# Patient Record
Sex: MALE | URBAN - METROPOLITAN AREA
[De-identification: names, ages, dates, MRNs, and addresses within clinical notes are randomized per-mention and may not be internally consistent; named-entity substitution may affect disease eponyms.]

---

## 2019-01-27 ENCOUNTER — EMERGENCY (EMERGENCY)
Facility: HOSPITAL | Age: 39
LOS: 0 days | Discharge: HOME | End: 2019-01-28
Attending: EMERGENCY MEDICINE
Payer: SELF-PAY

## 2019-01-27 VITALS
RESPIRATION RATE: 17 BRPM | OXYGEN SATURATION: 100 % | DIASTOLIC BLOOD PRESSURE: 87 MMHG | SYSTOLIC BLOOD PRESSURE: 126 MMHG | HEART RATE: 86 BPM

## 2019-01-27 VITALS
SYSTOLIC BLOOD PRESSURE: 134 MMHG | HEART RATE: 98 BPM | RESPIRATION RATE: 18 BRPM | DIASTOLIC BLOOD PRESSURE: 89 MMHG | HEIGHT: 66 IN | OXYGEN SATURATION: 100 % | WEIGHT: 160.06 LBS

## 2019-01-27 DIAGNOSIS — F10.129 ALCOHOL ABUSE WITH INTOXICATION, UNSPECIFIED: ICD-10-CM

## 2019-01-27 DIAGNOSIS — F10.929 ALCOHOL USE, UNSPECIFIED WITH INTOXICATION, UNSPECIFIED: ICD-10-CM

## 2019-01-27 PROCEDURE — 99285 EMERGENCY DEPT VISIT HI MDM: CPT | Mod: 25

## 2019-01-27 PROCEDURE — 99053 MED SERV 10PM-8AM 24 HR FAC: CPT

## 2019-01-27 NOTE — ED ADULT NURSE NOTE - NSIMPLEMENTINTERV_GEN_ALL_ED
Implemented All Fall with Harm Risk Interventions:  [place] to call system. Call bell, personal items and telephone within reach. Instruct patient to call for assistance. Room bathroom lighting operational. Non-slip footwear when patient is off stretcher. Physically safe environment: no spills, clutter or unnecessary equipment. Stretcher in lowest position, wheels locked, appropriate side rails in place. Provide visual cue, wrist band, yellow gown, etc. Monitor gait and stability. Monitor for mental status changes and reorient to person, place, and time. Review medications for side effects contributing to fall risk. Reinforce activity limits and safety measures with patient and family. Provide visual clues: red socks.

## 2019-01-27 NOTE — ED PROVIDER NOTE - OBJECTIVE STATEMENT
37 y/o m with no PMHx presenting to the ED c/o EtOH intoxication. Pt was found walking down [facility], unsteady on his feet due to EtOH intox. Pt states "I drank a lot". Denies head trauma. Denies any pain, CP, dizziness. Pt is a poor historian secondary to EtOH intoxication. [place]  used, ID#: [id number]

## 2019-01-27 NOTE — ED PROVIDER NOTE - PLAN OF CARE
1. return for worsening symptoms or anything concerning to you  2. take all home meds as prescribed  3. follow up with your pmd call to make an appointment    Alcohol Abuse    Alcohol intoxication occurs when the amount of alcohol that a person has consumed impairs his or her ability to mentally and physically function. Chronic alcohol consumption can also lead to a variety of health issues including neurological disease, stomach disease, heart disease, liver disease, etc. Do not drive after drinking alcohol. Drinking enough alcohol to end up in an Emergency Room suggests you may have an alcohol abuse problem. Seek help at a drug addiction center.    SEEK IMMEDIATE MEDICAL CARE IF YOU HAVE ANY OF THE FOLLOWING SYMPTOMS: seizures, vomiting blood, blood in your stool, lightheadedness/dizziness, or becoming shaky to tremulous when you stop drinking.

## 2019-01-27 NOTE — ED PROVIDER NOTE - PROGRESS NOTE DETAILS
patient talking on phone ate sandwich and drinking water. resting comfortably. Emile Summers M.D., Attending Physician patient feeling better. walked to bathroom without issue. states he will take a taxi home. clinically sober. ambulatory vss. tolerating PO. no signs of trauma. will d/c with follow up. Emile Summers M.D., Attending Physician

## 2019-01-27 NOTE — ED PROVIDER NOTE - MEDICAL DECISION MAKING DETAILS
37 y/o m BIB EMS for walking with unsteady gait down street, pt found to be intoxicated, states he had many drinks, denies head trauma, pain or any other c/o a this time. Exam with intoxicated male, no signs of trauma, exam nonfocal, will observe until clinically sober and reassess.

## 2019-01-27 NOTE — ED PROVIDER NOTE - NS_ ATTENDINGSCRIBEDETAILS _ED_A_ED_FT
I, Emile Summers MD,  performed the initial face to face bedside interview with this patient regarding history of present illness, review of symptoms and relevant past medical, social and family history.  I completed an independent physical examination.  I was the initial provider who evaluated this patient.  The history, relevant review of systems, past medical and surgical history, medical decision making, and physical examination was documented by the scribe in my presence and I attest to the accuracy of the documentation.

## 2019-01-27 NOTE — ED PROVIDER NOTE - CARE PLAN
Principal Discharge DX:	Alcohol intoxication  Assessment and plan of treatment:	1. return for worsening symptoms or anything concerning to you  2. take all home meds as prescribed  3. follow up with your pmd call to make an appointment    Alcohol Abuse    Alcohol intoxication occurs when the amount of alcohol that a person has consumed impairs his or her ability to mentally and physically function. Chronic alcohol consumption can also lead to a variety of health issues including neurological disease, stomach disease, heart disease, liver disease, etc. Do not drive after drinking alcohol. Drinking enough alcohol to end up in an Emergency Room suggests you may have an alcohol abuse problem. Seek help at a drug addiction center.    SEEK IMMEDIATE MEDICAL CARE IF YOU HAVE ANY OF THE FOLLOWING SYMPTOMS: seizures, vomiting blood, blood in your stool, lightheadedness/dizziness, or becoming shaky to tremulous when you stop drinking.

## 2019-01-27 NOTE — ED PROVIDER NOTE - PHYSICAL EXAMINATION
Constitutional: NAD, +intoxicated  Eyes: PERRLA EOMI  Head: Normocephalic atraumatic  Mouth: MMM  Cardiac: regular rate   Resp: Lungs CTAB  GI: Abd s/nt/nd  Neuro: CN2-12 intact  Skin: No rashes

## 2019-01-28 NOTE — ED ADULT NURSE REASSESSMENT NOTE - NS ED NURSE REASSESS COMMENT FT1
patient ambulatory with steady gait. awake, alert and oriented x 4, discharged home. written and verbal discharge instructions given to patient, patient verbalized back understanding.

## 2019-05-28 ENCOUNTER — EMERGENCY (EMERGENCY)
Facility: HOSPITAL | Age: 39
LOS: 1 days | Discharge: HOME | End: 2019-05-28
Attending: STUDENT IN AN ORGANIZED HEALTH CARE EDUCATION/TRAINING PROGRAM
Payer: MEDICAID

## 2019-05-28 VITALS
OXYGEN SATURATION: 99 % | SYSTOLIC BLOOD PRESSURE: 125 MMHG | HEART RATE: 67 BPM | WEIGHT: 182.98 LBS | RESPIRATION RATE: 18 BRPM | DIASTOLIC BLOOD PRESSURE: 83 MMHG | TEMPERATURE: 98 F | HEIGHT: 70.87 IN

## 2019-05-28 PROCEDURE — 99283 EMERGENCY DEPT VISIT LOW MDM: CPT

## 2019-05-28 PROCEDURE — 96372 THER/PROPH/DIAG INJ SC/IM: CPT

## 2019-05-28 PROCEDURE — 99283 EMERGENCY DEPT VISIT LOW MDM: CPT | Mod: 25

## 2019-05-28 RX ORDER — CYCLOBENZAPRINE HYDROCHLORIDE 10 MG/1
1 TABLET, FILM COATED ORAL
Qty: 9 | Refills: 0
Start: 2019-05-28

## 2019-05-28 RX ORDER — KETOROLAC TROMETHAMINE 30 MG/ML
60 SYRINGE (ML) INJECTION ONCE
Refills: 0 | Status: DISCONTINUED | OUTPATIENT
Start: 2019-05-28 | End: 2019-05-28

## 2019-05-28 RX ORDER — CYCLOBENZAPRINE HYDROCHLORIDE 10 MG/1
10 TABLET, FILM COATED ORAL ONCE
Refills: 0 | Status: COMPLETED | OUTPATIENT
Start: 2019-05-28 | End: 2019-05-28

## 2019-05-28 RX ADMIN — CYCLOBENZAPRINE HYDROCHLORIDE 10 MILLIGRAM(S): 10 TABLET, FILM COATED ORAL at 16:42

## 2019-05-28 RX ADMIN — Medication 60 MILLIGRAM(S): at 17:00

## 2019-05-28 RX ADMIN — Medication 60 MILLIGRAM(S): at 16:44

## 2019-05-28 NOTE — ED ADULT NURSE NOTE - OBJECTIVE STATEMENT
patient came in the er with c/o lower back pain  since 3 days ,,8/10,ambulatory ,no bruises ,swelling ,aax3,

## 2019-05-28 NOTE — ED PROVIDER NOTE - OBJECTIVE STATEMENT
39 y.o w/ pmh of chronic back pain x years, with worsening pain x 3 days after lifting heavy object. denies trauma, fall, dysuria, leg weakness, midline back pain, urinary/fecal incon or retention.

## 2019-05-28 NOTE — ED ADULT NURSE NOTE - NSIMPLEMENTINTERV_GEN_ALL_ED
Implemented All Universal Safety Interventions:  [place] to call system. Call bell, personal items and telephone within reach. Instruct patient to call for assistance. Room bathroom lighting operational. Non-slip footwear when patient is off stretcher. Physically safe environment: no spills, clutter or unnecessary equipment. Stretcher in lowest position, wheels locked, appropriate side rails in place.

## 2019-05-28 NOTE — ED PROVIDER NOTE - CLINICAL SUMMARY MEDICAL DECISION MAKING FREE TEXT BOX
patient presenting with acute on chronic back pain. neuro intact. no sign of caude. ambulatory. likely msk given no trauma. will treat pain and recommend pmd f.u

## 2019-12-30 ENCOUNTER — EMERGENCY (EMERGENCY)
Facility: HOSPITAL | Age: 39
LOS: 1 days | Discharge: HOME | End: 2019-12-30
Attending: EMERGENCY MEDICINE
Payer: SELF-PAY

## 2019-12-30 VITALS
DIASTOLIC BLOOD PRESSURE: 70 MMHG | HEIGHT: 64 IN | RESPIRATION RATE: 18 BRPM | TEMPERATURE: 98 F | WEIGHT: 184.97 LBS | SYSTOLIC BLOOD PRESSURE: 123 MMHG | HEART RATE: 65 BPM | OXYGEN SATURATION: 95 %

## 2019-12-30 VITALS
RESPIRATION RATE: 18 BRPM | SYSTOLIC BLOOD PRESSURE: 111 MMHG | DIASTOLIC BLOOD PRESSURE: 77 MMHG | HEART RATE: 60 BPM | TEMPERATURE: 98 F | OXYGEN SATURATION: 100 %

## 2019-12-30 PROCEDURE — 96372 THER/PROPH/DIAG INJ SC/IM: CPT

## 2019-12-30 PROCEDURE — 99283 EMERGENCY DEPT VISIT LOW MDM: CPT

## 2019-12-30 PROCEDURE — 99283 EMERGENCY DEPT VISIT LOW MDM: CPT | Mod: 25

## 2019-12-30 RX ORDER — KETOROLAC TROMETHAMINE 30 MG/ML
60 SYRINGE (ML) INJECTION ONCE
Refills: 0 | Status: DISCONTINUED | OUTPATIENT
Start: 2019-12-30 | End: 2019-12-30

## 2019-12-30 RX ORDER — CYCLOBENZAPRINE HYDROCHLORIDE 10 MG/1
10 TABLET, FILM COATED ORAL ONCE
Refills: 0 | Status: COMPLETED | OUTPATIENT
Start: 2019-12-30 | End: 2019-12-30

## 2019-12-30 RX ADMIN — CYCLOBENZAPRINE HYDROCHLORIDE 10 MILLIGRAM(S): 10 TABLET, FILM COATED ORAL at 10:00

## 2019-12-30 RX ADMIN — Medication 60 MILLIGRAM(S): at 10:00

## 2019-12-30 NOTE — ED ADULT NURSE NOTE - NSIMPLEMENTINTERV_GEN_ALL_ED
Implemented All Fall with Harm Risk Interventions:  [place] to call system. Call bell, personal items and telephone within reach. Instruct patient to call for assistance. Room bathroom lighting operational. Non-slip footwear when patient is off stretcher. Physically safe environment: no spills, clutter or unnecessary equipment. Stretcher in lowest position, wheels locked, appropriate side rails in place. Provide visual cue, wrist band, yellow gown, etc. Monitor gait and stability. Monitor for mental status changes and reorient to person, place, and time. Review medications for side effects contributing to fall risk. Reinforce activity limits and safety measures with patient and family. Provide visual clues: red socks.

## 2019-12-30 NOTE — ED PROVIDER NOTE - PATIENT PORTAL LINK FT
You can access the FollowMyHealth Patient Portal offered by Health system by registering at the following website: [email or web address]. By joining [employer]’s FollowMyHealth portal, you will also be able to view your health information using other applications (apps) compatible with our system.

## 2019-12-30 NOTE — ED ADULT NURSE NOTE - CHPI ED NUR SYMPTOMS NEG
no neck tenderness/no tingling/no difficulty bearing weight/no constipation/no fatigue/no motor function loss/no numbness/no bladder dysfunction/no anorexia/no bowel dysfunction

## 2019-12-30 NOTE — ED ADULT NURSE NOTE - OBJECTIVE STATEMENT
Patient present to ED with c/o lower back pain on going for awhile more than a year just more recently past 2 days sharp pain on pain scale 7/10 took tylenol 2 days ago with relief.

## 2019-12-30 NOTE — ED PROVIDER NOTE - OBJECTIVE STATEMENT
39 year old male with PMHx of chronic back pain and no pertinent PSHx presents to the ED with complaints of back pain for two days. Patient denies any specific trauma which could have triggered the pain. Patient describes the pain as being localized to his lower back. Patient otherwise denies any saddle anesthesia, urinary incontinence, urinary frequency, and all other acute complaints. NKDA.

## 2020-04-14 NOTE — ED ADULT TRIAGE NOTE - WEIGHT METHOD
Patient is scheduled with BRIANA on 4/27/2020 at 100.     Patient consents for appointment to be changed to a TELEPHONE VISIT     Sessions Via Telephone    Patient can be reached at [phone] for their appointment.     Patient notified to stay close by their phone 15 minutes before and 15 minutes after their scheduled appointment time, in case provider is running early or late.     Phone call may come from a blocked number.     If patient is unable to be reached for their appointment at the time when the provider calls, the provider will try calling again 5 minutes later. If the second call is missed, patient's appointment will be cancelled. Do not call back..                stated

## 2022-06-14 NOTE — ED ADULT NURSE NOTE - NSFALLRSKOUTCOME_ED_ALL_ED
Fall with Harm Risk Additional Notes: Discussed starting Accutane vs oral antibiotics and topical treatments at next OV. Patient will try to obtain previous dermatologist clinic notes. Patient given a lab slip and will follow up in 1 week to start isotretinoin paperwork. Render Risk Assessment In Note?: no Detail Level: Simple Additional Notes: Counseled patient regarding scarring treatments and patient was informed that laser, microneedling and chemical peels cannot be done while on Accutane.

## 2022-06-24 ENCOUNTER — EMERGENCY (EMERGENCY)
Facility: HOSPITAL | Age: 42
LOS: 1 days | Discharge: HOME | End: 2022-06-24
Attending: STUDENT IN AN ORGANIZED HEALTH CARE EDUCATION/TRAINING PROGRAM
Payer: MEDICAID

## 2022-06-24 VITALS
HEIGHT: 64 IN | OXYGEN SATURATION: 97 % | RESPIRATION RATE: 16 BRPM | TEMPERATURE: 98 F | HEART RATE: 66 BPM | SYSTOLIC BLOOD PRESSURE: 139 MMHG | DIASTOLIC BLOOD PRESSURE: 76 MMHG | WEIGHT: 185.19 LBS

## 2022-06-24 PROBLEM — M54.9 DORSALGIA, UNSPECIFIED: Chronic | Status: ACTIVE | Noted: 2019-12-30

## 2022-06-24 PROCEDURE — 90715 TDAP VACCINE 7 YRS/> IM: CPT

## 2022-06-24 PROCEDURE — 12011 RPR F/E/E/N/L/M 2.5 CM/<: CPT

## 2022-06-24 PROCEDURE — 99283 EMERGENCY DEPT VISIT LOW MDM: CPT | Mod: 25

## 2022-06-24 PROCEDURE — 90471 IMMUNIZATION ADMIN: CPT

## 2022-06-24 RX ORDER — TETANUS TOXOID, REDUCED DIPHTHERIA TOXOID AND ACELLULAR PERTUSSIS VACCINE, ADSORBED 5; 2.5; 8; 8; 2.5 [IU]/.5ML; [IU]/.5ML; UG/.5ML; UG/.5ML; UG/.5ML
0.5 SUSPENSION INTRAMUSCULAR ONCE
Refills: 0 | Status: COMPLETED | OUTPATIENT
Start: 2022-06-24 | End: 2022-06-24

## 2022-06-24 RX ADMIN — TETANUS TOXOID, REDUCED DIPHTHERIA TOXOID AND ACELLULAR PERTUSSIS VACCINE, ADSORBED 0.5 MILLILITER(S): 5; 2.5; 8; 8; 2.5 SUSPENSION INTRAMUSCULAR at 15:19

## 2022-06-24 NOTE — ED PROVIDER NOTE - CLINICAL SUMMARY MEDICAL DECISION MAKING FREE TEXT BOX
43 y/o male presenting with a head injury resulting in an eyebrow laceration. No indications for imaging at this time. Will repair laceration and update tetanus.

## 2022-06-24 NOTE — ED PROVIDER NOTE - OBJECTIVE STATEMENT
41 y/o male with no significant past medical history presents with a right eyebrow laceration. About 2 hours ago, patient states he hit the back of his head from his car door and the car door hit his right eyebrow. He denies any loss of consciousness, visual changes or any other injuries. Patient is unsure of his last tetanus. NKDA.

## 2022-06-24 NOTE — ED PROVIDER NOTE - PATIENT PORTAL LINK FT
You can access the FollowMyHealth Patient Portal offered by [facility] by registering at the following website: [email or web address]. By joining [employer]’s FollowMyHealth portal, you will also be able to view your health information using other applications (apps) compatible with our system.

## 2022-06-24 NOTE — ED ADULT NURSE NOTE - NSIMPLEMENTINTERV_GEN_ALL_ED
----- Message from Christiano Mcgee MD sent at 7/18/2017  5:02 PM CDT -----  Result of the thyroid ultrasound discussed with patient today. Arrangement will be made for FNA with Dr. Foreman, referral made   Implemented All Universal Safety Interventions:  [place] to call system. Call bell, personal items and telephone within reach. Instruct patient to call for assistance. Room bathroom lighting operational. Non-slip footwear when patient is off stretcher. Physically safe environment: no spills, clutter or unnecessary equipment. Stretcher in lowest position, wheels locked, appropriate side rails in place.

## 2022-06-24 NOTE — ED PROVIDER NOTE - EYES, MLM
Clear bilaterally, pupils equal, round and reactive to light. Clear bilaterally, pupils equal, round and reactive to light. EMOI

## 2022-06-24 NOTE — ED PROVIDER NOTE - NSFOLLOWUPINSTRUCTIONS_ED_ALL_ED_FT

## 2022-06-24 NOTE — ED PROVIDER NOTE - NS ED ATTENDING STATEMENT MOD
This was a shared visit with the TARUN. I reviewed and verified the documentation and independently performed the documented:

## 2022-06-24 NOTE — ED PROVIDER NOTE - SKIN, MLM
1.5cm mildly gaping linear laceration in right eyebrow. No sensory deficits and extraocular is intact.

## 2022-07-01 ENCOUNTER — EMERGENCY (EMERGENCY)
Facility: HOSPITAL | Age: 42
LOS: 1 days | Discharge: ANOTHER HOSPITAL | End: 2022-07-01
Attending: STUDENT IN AN ORGANIZED HEALTH CARE EDUCATION/TRAINING PROGRAM
Payer: MEDICAID

## 2022-07-01 VITALS
RESPIRATION RATE: 18 BRPM | SYSTOLIC BLOOD PRESSURE: 125 MMHG | HEART RATE: 78 BPM | TEMPERATURE: 98 F | WEIGHT: 184.97 LBS | HEIGHT: 64 IN | DIASTOLIC BLOOD PRESSURE: 70 MMHG | OXYGEN SATURATION: 98 %

## 2022-07-01 PROCEDURE — L9995: CPT

## 2022-07-01 PROCEDURE — G0463: CPT

## 2022-07-01 NOTE — ED PROVIDER NOTE - NS ED ROS FT

## 2022-07-01 NOTE — ED PROVIDER NOTE - NS ED MD DISPO DISCHARGE CCDA
Encounter addended by: Jes Stone RN on: 3/22/2022 5:40 PM   Actions taken: Vitals modified Patient/Caregiver provided printed discharge information.

## 2022-07-01 NOTE — ED PROVIDER NOTE - PHYSICAL EXAMINATION
Gen: AAOx3, non-toxic  Head: NCAT  HEENT: EOMI, oral mucosa moist, normal conjunctiva  Lung: CTAB, no respiratory distress, no wheezes/rhonchi/rales B/L, speaking in full sentences  CV: RRR, no murmurs, rubs or gallops  Abd: soft, NTND, no guarding, no CVA tenderness  MSK: no visible deformities  Neuro: No focal sensory or motor deficits, normal CN exam   Skin: +well healing R eyebrow lac with 3 sutures in place  Psych: normal affect.     Bryan Pierce, DO

## 2022-07-01 NOTE — ED PROVIDER NOTE - CLINICAL SUMMARY MEDICAL DECISION MAKING FREE TEXT BOX
42M presents for removal of 3 prolene sutures over right eyebrow. Placed on 6/24. States it has been healing well, no signs of infection. Will remove and dc.

## 2022-07-01 NOTE — ED PROVIDER NOTE - OBJECTIVE STATEMENT
42M presents for removal of 3 prolene sutures over right eyebrow. Placed on 6/24. States it has been healing well, no signs of infection.

## 2022-07-01 NOTE — ED PROVIDER NOTE - PATIENT PORTAL LINK FT
You can access the FollowMyHealth Patient Portal offered by [facility] by registering at the following website: [email or web address]. By joining [employer]’s FollowMyHealth portal, you will also be able to view your health information using other applications (apps) compatible with our system.

## 2022-12-19 ENCOUNTER — EMERGENCY (EMERGENCY)
Facility: HOSPITAL | Age: 42
LOS: 1 days | Discharge: HOME | End: 2022-12-19
Attending: EMERGENCY MEDICINE
Payer: MEDICAID

## 2022-12-19 VITALS
TEMPERATURE: 98 F | HEART RATE: 56 BPM | OXYGEN SATURATION: 98 % | DIASTOLIC BLOOD PRESSURE: 76 MMHG | HEIGHT: 67 IN | SYSTOLIC BLOOD PRESSURE: 117 MMHG | RESPIRATION RATE: 22 BRPM | WEIGHT: 186.07 LBS

## 2022-12-19 VITALS — HEART RATE: 59 BPM | RESPIRATION RATE: 18 BRPM | OXYGEN SATURATION: 98 %

## 2022-12-19 PROCEDURE — 99284 EMERGENCY DEPT VISIT MOD MDM: CPT

## 2022-12-19 PROCEDURE — 73030 X-RAY EXAM OF SHOULDER: CPT

## 2022-12-19 PROCEDURE — 73030 X-RAY EXAM OF SHOULDER: CPT | Mod: 26,RT

## 2022-12-19 PROCEDURE — 99283 EMERGENCY DEPT VISIT LOW MDM: CPT | Mod: 25

## 2022-12-19 RX ORDER — IBUPROFEN 200 MG
600 TABLET ORAL ONCE
Refills: 0 | Status: COMPLETED | OUTPATIENT
Start: 2022-12-19 | End: 2022-12-19

## 2022-12-19 RX ADMIN — Medication 600 MILLIGRAM(S): at 10:57

## 2022-12-19 NOTE — ED PROVIDER NOTE - PATIENT PORTAL LINK FT
You can access the FollowMyHealth Patient Portal offered by [facility] by registering at the following website: [email or web address]. By joining [employer]’s FollowMyHealth portal, you will also be able to view your health information using other applications (apps) compatible with our system.

## 2022-12-19 NOTE — ED PROVIDER NOTE - NSFOLLOWUPINSTRUCTIONS_ED_ALL_ED_FT
Thank you for choosing [facility] for your health care.    You were seen in the ER for right shoulder pain which is likely a muscular or rotator cuff injury.  We recommend you use Tylenol and Motrin at home as directed on the packaging along with ice to the painful areas.  You should follow-up with orthopedics if you are still having pain beyond 3 to 5 days for further evaluation.

## 2022-12-19 NOTE — ED PROVIDER NOTE - OBJECTIVE STATEMENT
42-year-old male presenting complaining of right shoulder pain.  He was moving boxes 3 days ago and the next day developed achy pains in his right shoulder worse when he moves around.  No loss of sensation.  He has not been taking any medications at home for the pain.  He denies any other complaints.  The pain does not radiate out of the shoulder.

## 2022-12-19 NOTE — ED ADULT NURSE NOTE - CHPI ED NUR SYMPTOMS NEG
no abrasion/no back pain/no bruising/no deformity/no difficulty bearing weight/no fever/no numbness/no weakness

## 2022-12-19 NOTE — ED PROVIDER NOTE - CLINICAL SUMMARY MEDICAL DECISION MAKING FREE TEXT BOX
Suspect muscular etiology of pain given his history.  X-ray right shoulder was obtained at patient's request for my suspicion for fracture or dislocation is low.  We will treat pain and recommend follow-up with orthopedics.

## 2022-12-19 NOTE — ED PROVIDER NOTE - NSFOLLOWUPCLINICS_GEN_ALL_ED_FT
[facility]  Orthopedics  [address] [place] [address]  Phone: [phone]  Fax: [phone]  Follow Up Time: 4-6 Days

## 2022-12-19 NOTE — ED PROVIDER NOTE - PHYSICAL EXAMINATION
Exam:  General: Patient well appearing, vital signs within normal limits  HEENT: airway patent with moist mucous membranes  Cardiac: RRR with strong peripheral pulses  Respiratory: no respiratory distress  Neuro: no gross neurologic deficits, strength and sensation to light touch intact  MSK: no gross deformity, ROM of L shoulder slightly limited secondary to pain, point tender along R pectoralis tendon reproduces complaint  Skin: warm, well perfused  Psych: normal mood and affect

## 2022-12-19 NOTE — ED ADULT TRIAGE NOTE - CHIEF COMPLAINT QUOTE
right shoulder pain as per pt he moved some boxes on Friday and pain started saturday, pain is worst on moving

## 2022-12-19 NOTE — ED ADULT NURSE NOTE - OBJECTIVE STATEMENT
AS per pt. c/o right shoulder pain that started 3days ago that doesn't get better with medication and has since gotten worst. Denies all other symptoms

## 2023-02-08 NOTE — ED PROVIDER NOTE - SCRIBE NAME
Jermaine Solis was seen and treated in our emergency department on 2/8/2023. He may return to work on 02/10/2023. If you have any questions or concerns, please don't hesitate to call.       Beronica Saha MD
Arun Urban (Scribe)

## 2023-05-03 NOTE — ED ADULT NURSE NOTE - CHPI ED NUR SYMPTOMS POS
[FreeTextEntry1] : follow up\par  [de-identified] : follow up\par \par \par DM- diet controlled - last hba1c 6.1\par \par HTN- doing well BLEEDING/LACERATION/PAIN

## 2023-08-11 NOTE — ED ADULT NURSE NOTE - PAIN RATING/NUMBER SCALE (0-10): REST
You can access the FollowMyHealth Patient Portal offered by [facility] by registering at the following website: [email or web address]. By joining [employer]’s FollowMyHealth portal, you will also be able to view your health information using other applications (apps) compatible with our system.
7

## 2023-08-19 ENCOUNTER — EMERGENCY (EMERGENCY)
Facility: HOSPITAL | Age: 43
LOS: 1 days | Discharge: HOME | End: 2023-08-19
Attending: STUDENT IN AN ORGANIZED HEALTH CARE EDUCATION/TRAINING PROGRAM
Payer: MEDICAID

## 2023-08-19 VITALS
HEART RATE: 65 BPM | TEMPERATURE: 98 F | SYSTOLIC BLOOD PRESSURE: 123 MMHG | WEIGHT: 179.9 LBS | DIASTOLIC BLOOD PRESSURE: 77 MMHG | RESPIRATION RATE: 18 BRPM | OXYGEN SATURATION: 97 % | HEIGHT: 67 IN

## 2023-08-19 PROCEDURE — 73610 X-RAY EXAM OF ANKLE: CPT

## 2023-08-19 PROCEDURE — 99284 EMERGENCY DEPT VISIT MOD MDM: CPT | Mod: 25

## 2023-08-19 PROCEDURE — 73630 X-RAY EXAM OF FOOT: CPT

## 2023-08-19 PROCEDURE — 99284 EMERGENCY DEPT VISIT MOD MDM: CPT

## 2023-08-19 PROCEDURE — 73630 X-RAY EXAM OF FOOT: CPT | Mod: 26,RT

## 2023-08-19 PROCEDURE — 73610 X-RAY EXAM OF ANKLE: CPT | Mod: 26,RT

## 2023-08-19 RX ORDER — IBUPROFEN 200 MG
600 TABLET ORAL ONCE
Refills: 0 | Status: COMPLETED | OUTPATIENT
Start: 2023-08-19 | End: 2023-08-19

## 2023-08-19 RX ADMIN — Medication 600 MILLIGRAM(S): at 11:47

## 2023-08-19 RX ADMIN — Medication 600 MILLIGRAM(S): at 11:17

## 2023-08-19 NOTE — ED PROVIDER NOTE - CLINICAL SUMMARY MEDICAL DECISION MAKING FREE TEXT BOX
43-year-old male with no past medical history presents with right lateral ankle and foot pain that began on Thursday.  Patient denies any obvious injury, but reports that he was sitting crosslegged and his foot was resting up against a desk.  Has not taken any medications for the pain.  Patient is able to ambulate.    Will obtain xrays to r/o fracture and give medications for pain. Low suspicion for fracture. Will give podiatry follow up.

## 2023-08-19 NOTE — ED PROVIDER NOTE - OBJECTIVE STATEMENT
43-year-old male with no past medical history presents with right lateral ankle and foot pain that began on Thursday.  Patient denies any obvious injury, but reports that he was sitting crosslegged and his foot was resting up against a desk.  Has not taken any medications for the pain.  Patient is able to ambulate.

## 2023-08-19 NOTE — ED PROVIDER NOTE - PROGRESS NOTE DETAILS
xrays negative. Will ace wrap and have patient follow up with podiatry as needed. Pt is well appearing walking with steady gait, stable for discharge and follow up without fail with medical doctor. I had a detailed discussion with the patient and/or guardian regarding the historical points, exam findings, and any diagnostic results supporting the discharge diagnosis. Pt educated on care and need for follow up. Strict return instructions and red flag signs and symptoms discussed with patient. Questions answered. Pt shows understanding of discharge information and agrees to follow.

## 2023-08-19 NOTE — ED PROVIDER NOTE - PHYSICAL EXAMINATION
Right foot - +TTP below lateral malleolus/lateral aspect of foot. No swelling, discoloration, erythema, ecchymosis, deformity, lacerations, abrasions, ulcers or sensory deficits. Popliteal, dorsalis pedis and posterior tibial pulses equally strong 4+ bilaterally. Capillary refill in lower extremity < 2 seconds. Full range of motion during dorsiflexion, plantar flexion, eversion and inversion. Extensor hallucis longus flexion and extension intact. DP and PT pulses 2+ bilaterally.

## 2023-08-19 NOTE — ED PROVIDER NOTE - NSFOLLOWUPINSTRUCTIONS_ED_ALL_ED_FT
Follow up with podiatry if pain persists longer than 2 weeks.     [facility]  Podiatry/Wound Care  [address] [place] [address]  Phone: [phone]  They have walk in hours on Tuesday from 12:30 pm to 2 pm and Thursdays 8:30 am to 10 am.     You can take motrin/tylenol as needed for pain.    If you experience any new or worsening symptoms or if you are concerned you can always come back to the emergency for a re-evaluation.

## 2023-08-19 NOTE — ED PROVIDER NOTE - PATIENT PORTAL LINK FT
You can access the FollowMyHealth Patient Portal offered by [facility] by registering at the following website: [email or web address]. By joining [employer]’s FollowMyHealth portal, you will also be able to view your health information using other applications (apps) compatible with our system.

## 2023-08-19 NOTE — ED ADULT NURSE NOTE - NSFALLUNIVINTERV_ED_ALL_ED
Call bell, personal items and telephone in reach/Instruct patient to call for assistance before getting out of bed/chair/stretcher/[place] to call system/Physically safe environment - no spills, clutter or unnecessary equipment/Purposeful proactive rounding/Room/bathroom lighting operational, light cord in reach

## 2023-09-26 ENCOUNTER — APPOINTMENT (OUTPATIENT)
Dept: UROLOGY | Facility: CLINIC | Age: 43
End: 2023-09-26
Payer: MEDICAID

## 2023-09-26 VITALS
OXYGEN SATURATION: 97 % | SYSTOLIC BLOOD PRESSURE: 112 MMHG | DIASTOLIC BLOOD PRESSURE: 75 MMHG | TEMPERATURE: 98.1 F | WEIGHT: 184 LBS | HEART RATE: 71 BPM | BODY MASS INDEX: 28.88 KG/M2 | HEIGHT: 67 IN

## 2023-09-26 DIAGNOSIS — Z80.42 FAMILY HISTORY OF MALIGNANT NEOPLASM OF PROSTATE: ICD-10-CM

## 2023-09-26 DIAGNOSIS — Z78.9 OTHER SPECIFIED HEALTH STATUS: ICD-10-CM

## 2023-09-26 DIAGNOSIS — F17.200 NICOTINE DEPENDENCE, UNSPECIFIED, UNCOMPLICATED: ICD-10-CM

## 2023-09-26 PROBLEM — Z00.00 ENCOUNTER FOR PREVENTIVE HEALTH EXAMINATION: Status: ACTIVE | Noted: 2023-09-26

## 2023-09-26 PROCEDURE — 99204 OFFICE O/P NEW MOD 45 MIN: CPT

## 2023-09-28 LAB
FSH SERPL-MCNC: 4.9 IU/L
LH SERPL-ACNC: 3.6 IU/L
PROLACTIN SERPL-MCNC: 11 NG/ML
TESTOST SERPL-MCNC: 362 NG/DL

## 2023-10-03 ENCOUNTER — APPOINTMENT (OUTPATIENT)
Dept: UROLOGY | Facility: CLINIC | Age: 43
End: 2023-10-03
Payer: MEDICAID

## 2023-10-03 PROCEDURE — 99406 BEHAV CHNG SMOKING 3-10 MIN: CPT

## 2023-10-03 PROCEDURE — 99214 OFFICE O/P EST MOD 30 MIN: CPT | Mod: 95,25

## 2024-01-25 NOTE — ED PROVIDER NOTE - CPE EDP RESP NORM
James is a 64 year old who is being evaluated via a billable video visit.      How would you like to obtain your AVS? MyChart  If the video visit is dropped, the invitation should be resent by: Text to cell phone: [phone]  Will anyone else be joining your video visit? No          Assessment & Plan   Problem List Items Addressed This Visit    None  Visit Diagnoses       Elevated prostate specific antigen (PSA)    -  Primary    Relevant Orders    PSA tumor marker    Benign prostatic hyperplasia with lower urinary tract symptoms, symptom details unspecified        Relevant Medications    finasteride (PROSCAR) 5 MG tablet    tamsulosin (FLOMAX) 0.4 MG capsule             Ordering of each unique test  Prescription drug management         No follow-ups on file.      Subjective   James is a 64 year old, presenting for the following health issues:  RECHECK (Medication refills)    HPI     Follow up for benign prostatic hyperplasia/elevated psa.  He has h/o elevated psa s/p biopsy in the past.  Prostate size was 130 gms.  He is on flomax/proscar for LUTS.  He has no urinary complaints.      Review of Systems  Constitutional, HEENT, cardiovascular, pulmonary, gi and gu systems are negative, except as otherwise noted.      Objective    Vitals - Patient Reported  Pain Score: No Pain (0)        Physical Exam   GENERAL: alert and no distress  RESP: No audible wheeze, cough, or visible cyanosis.    NEURO: Cranial nerves grossly intact.  Mentation and speech appropriate for age.  PSYCH: Appropriate affect, tone, and pace of words     Elevated psa:  check psa  BPH; cont with meds.  Patient to come in for bladder scan for residual urine.      Video-Visit Details    Type of service:  Video Visit     Originating Location (pt. Location): Home    Distant Location (provider location):  On-site  Platform used for Video Visit: Telephone  Signed Electronically by: James Cardozo MD     
Patient scheduled for bladder scan 1/26/24.  Kailey Gray, CMA    
normal...

## 2024-05-31 ENCOUNTER — APPOINTMENT (OUTPATIENT)
Dept: UROLOGY | Facility: CLINIC | Age: 44
End: 2024-05-31
Payer: MEDICAID

## 2024-05-31 VITALS
WEIGHT: 184 LBS | TEMPERATURE: 98.3 F | HEIGHT: 67 IN | DIASTOLIC BLOOD PRESSURE: 81 MMHG | BODY MASS INDEX: 28.88 KG/M2 | SYSTOLIC BLOOD PRESSURE: 116 MMHG | HEART RATE: 78 BPM | OXYGEN SATURATION: 97 %

## 2024-05-31 DIAGNOSIS — N52.8 OTHER MALE ERECTILE DYSFUNCTION: ICD-10-CM

## 2024-05-31 DIAGNOSIS — R68.82 DECREASED LIBIDO: ICD-10-CM

## 2024-05-31 DIAGNOSIS — Z12.5 ENCOUNTER FOR SCREENING FOR MALIGNANT NEOPLASM OF PROSTATE: ICD-10-CM

## 2024-05-31 PROCEDURE — G2211 COMPLEX E/M VISIT ADD ON: CPT | Mod: NC,1L

## 2024-05-31 PROCEDURE — 99214 OFFICE O/P EST MOD 30 MIN: CPT

## 2024-05-31 RX ORDER — TADALAFIL 5 MG/1
5 TABLET ORAL
Qty: 30 | Refills: 1 | Status: ACTIVE | COMMUNITY
Start: 2024-05-31 | End: 1900-01-01

## 2024-05-31 NOTE — PHYSICAL EXAM
[Normal Appearance] : normal appearance [Well Groomed] : well groomed [General Appearance - In No Acute Distress] : no acute distress [Edema] : no peripheral edema [Respiration, Rhythm And Depth] : normal respiratory rhythm and effort [Exaggerated Use Of Accessory Muscles For Inspiration] : no accessory muscle use [Abdomen Soft] : soft [Abdomen Tenderness] : non-tender [Costovertebral Angle Tenderness] : no ~M costovertebral angle tenderness [] : no rash [Normal Station and Gait] : the gait and station were normal for the patient's age [No Focal Deficits] : no focal deficits [Oriented To Time, Place, And Person] : oriented to person, place, and time [Affect] : the affect was normal [Mood] : the mood was normal [No Palpable Adenopathy] : no palpable adenopathy

## 2024-05-31 NOTE — HISTORY OF PRESENT ILLNESS
[Currently Experiencing ___] :  [unfilled] [Erectile Dysfunction] : Erectile Dysfunction [None] : None [FreeTextEntry1] : Mr. Liang is a very pleasant 44 year old man here today for prostate cancer screening and ED. He reports feeling well since he was here last. He reports PCP would like him to get prostate cancer screening. He additionally would like to try medication for his ED at this time. Denies any hematuria or dysuria.

## 2024-05-31 NOTE — ASSESSMENT
[FreeTextEntry1] : Mr. Liang is a very pleasant 44 year old man here today for prostate cancer screening and ED, worsening. He reports feeling well since he was here last. He reports PCP would like him to get prostate cancer screening. He additionally would like to try medication for his ED at this time. Denies any hematuria or dysuria. Grandfather recently diagnosed with prostate cancer. PSA. Trial 5 mg tadalafil. I discussed the risks, benefits, alternatives, and possible side effects of Cialis (tadalafil) therapy with the patient, including but not limited to headache, flushing, upset stomach, blurry vision, change in color vision, vision loss, and priapism with the patient. RTO in 1 month TEB.  Patient is being seen today for evaluation and management of a chronic and longitudinal ongoing condition and I am of the primary treating physician

## 2024-06-03 LAB — PSA SERPL-MCNC: 1.49 NG/ML

## 2024-07-09 ENCOUNTER — APPOINTMENT (OUTPATIENT)
Dept: UROLOGY | Facility: CLINIC | Age: 44
End: 2024-07-09
Payer: MEDICAID

## 2024-07-09 DIAGNOSIS — Z12.5 ENCOUNTER FOR SCREENING FOR MALIGNANT NEOPLASM OF PROSTATE: ICD-10-CM

## 2024-07-09 DIAGNOSIS — R68.82 DECREASED LIBIDO: ICD-10-CM

## 2024-07-09 DIAGNOSIS — N52.8 OTHER MALE ERECTILE DYSFUNCTION: ICD-10-CM

## 2024-07-09 DIAGNOSIS — N28.1 CYST OF KIDNEY, ACQUIRED: ICD-10-CM

## 2024-07-09 PROCEDURE — G2211 COMPLEX E/M VISIT ADD ON: CPT | Mod: NC,1L

## 2024-07-09 PROCEDURE — 99214 OFFICE O/P EST MOD 30 MIN: CPT

## 2024-07-09 RX ORDER — TADALAFIL 10 MG/1
10 TABLET, FILM COATED ORAL
Qty: 30 | Refills: 1 | Status: ACTIVE | COMMUNITY
Start: 2024-07-09 | End: 1900-01-01

## 2024-12-14 NOTE — ED ADULT NURSE NOTE - CAS EDP DISCH TYPE
[facility]ensive [facility]  Cardiology  Progress Note    Patient Name: Telly Montoya  MRN: [id number]  Admission Date: 12/13/2024  Hospital Length of Stay: 1 days  Code Status: Full Code   Attending Physician: aMry Sarmiento MD   Primary Care Physician: Archana Coronado [id number]     Product Code [id number]     DISPENSE STATUS TRANSFUSED     CODING SYSTEM AMND743     Unit Blood Type Code 5100     Unit Blood Type O POS     Unit Expiration [id number]     CROSSMATCH INTERPRETATION  Compatible     UNIT NUMBER [id number]     Product Code H1386F60     [id number]     Product Code U1707R26     DISPENSE STATUS ISSUED     CODING SYSTEM RTNP627     Unit Blood Type Code 5100     Unit Blood Type O POS     Unit Expiration [id number]     CROSSMATCH INTERPRETATION Compatible     UNIT NUMBER [id number]     Product Code C2111K21     [id number]     Product Code D3293K61     [id number]     Product Code F9228E52     1550                    Colton Cantu MD  Cardiology  [facility] ([facility]

## 2024-12-23 NOTE — ED ADULT NURSE NOTE - NSTOBACCO TYPE_GEN_A_CORE_RD
Plan of care reviewed and updated . Pt AA+O . Pt's pain is managed with medication ordered at this timed . Pt's V/S are as charted . No falls this shift. . Pt is oriented to room and call system . Will continue to monitor .      Cigarettes

## 2025-01-07 ENCOUNTER — APPOINTMENT (OUTPATIENT)
Dept: UROLOGY | Facility: CLINIC | Age: 45
End: 2025-01-07
Payer: MEDICAID

## 2025-01-07 VITALS
OXYGEN SATURATION: 97 % | TEMPERATURE: 89.6 F | DIASTOLIC BLOOD PRESSURE: 82 MMHG | SYSTOLIC BLOOD PRESSURE: 120 MMHG | HEART RATE: 71 BPM

## 2025-01-07 DIAGNOSIS — R68.82 DECREASED LIBIDO: ICD-10-CM

## 2025-01-07 DIAGNOSIS — Z12.5 ENCOUNTER FOR SCREENING FOR MALIGNANT NEOPLASM OF PROSTATE: ICD-10-CM

## 2025-01-07 DIAGNOSIS — N52.8 OTHER MALE ERECTILE DYSFUNCTION: ICD-10-CM

## 2025-01-07 DIAGNOSIS — N28.1 CYST OF KIDNEY, ACQUIRED: ICD-10-CM

## 2025-01-07 PROCEDURE — 99214 OFFICE O/P EST MOD 30 MIN: CPT

## 2025-01-07 PROCEDURE — 76775 US EXAM ABDO BACK WALL LIM: CPT

## 2025-01-07 PROCEDURE — G2211 COMPLEX E/M VISIT ADD ON: CPT | Mod: NC

## 2025-08-26 ENCOUNTER — EMERGENCY (EMERGENCY)
Facility: HOSPITAL | Age: 45
LOS: 1 days | End: 2025-08-26
Attending: STUDENT IN AN ORGANIZED HEALTH CARE EDUCATION/TRAINING PROGRAM
Payer: SELF-PAY

## 2025-08-26 VITALS
SYSTOLIC BLOOD PRESSURE: 128 MMHG | WEIGHT: 188.94 LBS | OXYGEN SATURATION: 95 % | DIASTOLIC BLOOD PRESSURE: 82 MMHG | HEART RATE: 83 BPM | RESPIRATION RATE: 18 BRPM | TEMPERATURE: 98 F

## 2025-08-26 PROCEDURE — 12052 INTMD RPR FACE/MM 2.6-5.0 CM: CPT

## 2025-08-26 PROCEDURE — 12013 RPR F/E/E/N/L/M 2.6-5.0 CM: CPT

## 2025-08-26 PROCEDURE — 99284 EMERGENCY DEPT VISIT MOD MDM: CPT | Mod: 25

## 2025-08-26 PROCEDURE — 99282 EMERGENCY DEPT VISIT SF MDM: CPT | Mod: 25

## 2025-08-26 RX ORDER — BACITRACIN 500 UNIT/G
1 OINTMENT (GRAM) TOPICAL ONCE
Refills: 0 | Status: COMPLETED | OUTPATIENT
Start: 2025-08-26 | End: 2025-08-26

## 2025-08-26 RX ADMIN — Medication 1 APPLICATION(S): at 18:23

## 2025-09-03 ENCOUNTER — EMERGENCY (EMERGENCY)
Facility: HOSPITAL | Age: 45
LOS: 1 days | End: 2025-09-03
Attending: EMERGENCY MEDICINE | Admitting: EMERGENCY MEDICINE
Payer: SELF-PAY

## 2025-09-03 VITALS
HEART RATE: 84 BPM | SYSTOLIC BLOOD PRESSURE: 121 MMHG | TEMPERATURE: 98 F | RESPIRATION RATE: 18 BRPM | DIASTOLIC BLOOD PRESSURE: 77 MMHG | OXYGEN SATURATION: 97 %

## 2025-09-03 PROCEDURE — G0463: CPT

## 2025-09-03 PROCEDURE — L9995: CPT
